# Patient Record
(demographics unavailable — no encounter records)

---

## 2017-04-18 NOTE — ERD
ER Documentation


Chief Complaint


Date/Time


DATE: 4/18/17 


TIME: 07:29


Chief Complaint


vaginal pain/itching/whitish discharge x 10 days





HPI


28-year-old female presents with vaginal itching and white discharge for the 

past 10 days.  She states that she is trying to get pregnant at this time, and 

would prefer to treat conservatively.  She denies any fevers, chills.  About 2 

weeks ago, she met with her OB/GYN who did a full panel of STDs, including 

blood work.  She is currently taking prenatal vitamins.  Patient reports that 

her workup was all negative.





ROS


All systems reviewed and are negative except as per history of present illness.





Medications


Home Meds


Active Scripts


Miconazole Nitrate* (Miconazole Nitrate*) 2% - 15 Gm Cr, 1 APPLIC TOP BID, #15 

TUB


   Prov:ANIBAL CORMIER PA-C         4/18/17


Clotrimazole* (Clotrimazole-7*) Vaginal Cream..g., 1 APPLIC VAG HS for 7 Days, 

EA


   Prov:ANIBAL CORMIER PA-C         4/18/17





Allergies


Allergies:  


Coded Allergies:  


     tramadol (Verified  Allergy, Unknown, sob, 4/18/17)





PMhx/Soc


History of Surgery:  Yes (Bilateral Breast Augmentation)


Anesthesia Reaction:  No


Hx Neurological Disorder:  No


Hx Respiratory Disorders:  No


Hx Cardiac Disorders:  No


Hx Psychiatric Problems:  No


Hx Miscellaneous Medical Probl:  Yes (Miscarriage)


Hx Alcohol Use:  No


Hx Substance Use:  No


Hx Tobacco Use:  No





Physical Exam


Vitals





Vital Signs








  Date Time  Temp Pulse Resp B/P Pulse Ox O2 Delivery O2 Flow Rate FiO2


 


4/18/17 06:10 97.6 63 20 98/61 100   








Physical Exam


General: Well-developed, well-nourished.  The patient appears in no acute 

distress.


HEENT: Head is normocephalic, atraumatic.  No scleral icterus.


Neck: Supple.  Nontender.


Lungs: Clear to auscultation.  Normal air movement.


Heart: Regular rate and rhythm.  S1 and S2 are normal.  No murmurs, gallops, or 

rubs.


Abdomen: Nondistended.


: There is White yeastlike discharge, no CMT tenderness, no masses.  No 

bleeding.


Extremities: No clubbing or cyanosis. Moving extremities x 4. No weakness.


Neurologic: Alert and oriented 3.  No focal deficits. Normal speech and gait.


Skin: Normal turgor.  No rash or lesions.





Procedures/MDM


ER course:


Urine pregnancy was negative.





MDM: 20-year-old female presents with yeast vaginitis 2 days.  Differentials 

include UTI, PID, cervicitis, HSV, syphilis, and among others.





Departure


Diagnosis:  


 Primary Impression:  


 Vaginitis


Condition:  Good


Patient Instructions:  Vaginal Infection: Yeast (Candidiasis)





Additional Instructions:  


Call your primary care doctor TOMORROW for an appointment during the next 1-2 

days.See the doctor sooner or return here if your condition worsens before your 

appointment time.











ANIBAL CORMIER PA-C Apr 18, 2017 07:31

## 2017-04-29 NOTE — ERD
ER Documentation


Chief Complaint


Date/Time


DATE: 4/29/17 


TIME: 11:54


Chief Complaint


FEVER/COUGH/SORE THROAT X 1 WEEK





HPI


This 20-year-old female presents with fever and cough and sore throat for last 

week.  She has productive mucus.  She denies wheezing, vomiting, abdominal pain

, chest pain, neck stiffness, rashes.





ROS


All systems reviewed and are negative except as per history of present illness.





Medications


Home Meds


Active Scripts


Azithromycin* (Zithromax*) 250 Mg Tablet, 250 MG PO .ZPACK AS DIRECTED, #6 TAB


   TAKE 500 MG (2 TABS) THE FIRST DAY THEN 250 MG (1 TAB) DAYS 2-5


   Prov:JESSI CORBIN MD         4/29/17


Dextromethorphan Hb-Promethazine Hcl (Promethazine DM Syrup) 473 Ml Syrup, 5 ML 

PO Q6H Y for COUGH, #4 OZ


   Prov:JESSI CORBIN MD         4/29/17


Ibuprofen* (Motrin*) 600 Mg Tab, 600 MG PO Q6, #15 TAB


   Prov:JESSI CORBIN MD         4/29/17


Miconazole Nitrate* (Miconazole Nitrate*) 2% - 15 Gm Cr, 1 APPLIC TOP BID, #15 

TUB


   Prov:ANIBAL CORMIER PA-C         4/18/17


Clotrimazole* (Clotrimazole-7*) Vaginal Cream..g., 1 APPLIC VAG HS for 7 Days, 

EA


   Prov:ANIBAL CORMIER PA-C         4/18/17





Allergies


Allergies:  


Coded Allergies:  


     tramadol (Verified  Allergy, Unknown, sob, 4/18/17)





PMhx/Soc


History of Surgery:  Yes (Bilateral Breast Augmentation)


Anesthesia Reaction:  No


Hx Neurological Disorder:  No


Hx Respiratory Disorders:  No


Hx Cardiac Disorders:  No


Hx Psychiatric Problems:  No


Hx Miscellaneous Medical Probl:  Yes (Miscarriage)


Hx Alcohol Use:  No


Hx Substance Use:  No


Hx Tobacco Use:  No





Physical Exam


Vitals





Vital Signs








  Date Time  Temp Pulse Resp B/P Pulse Ox O2 Delivery O2 Flow Rate FiO2


 


4/29/17 11:24 98.1 75 18 115/65 99   








Physical Exam


Const:  [] Alert, non-ill-appearing.


Head:   Atraumatic 


Eyes:    Normal Conjunctiva


ENT:    Normal External Ears, Nose and Mouth.  TMs normal.  Pupils nasal 

congestion with postnasal drip.


Neck:               Full range of motion..~ No meningismus.


Resp:    Clear to auscultation bilaterally.  Coarse cough without rales, 

wheezing or retractions.


Cardio:    Regular rate and rhythm, no murmurs


Abd:    Soft, non tender, non distended. Normal bowel sounds


Skin:    No petechiae or rashes


Back:    No midline or flank tenderness


Ext:    No cyanosis, or edema


Neur:    Awake and alert


Psych:    Normal Mood and Affect





Procedures/MDM


Patient presents with URI symptoms for last week without evidence of 

respiratory distress or hypoxemia.  Given the duration patient request she will 

be treated with Zithromax, promethazine and ibuprofen although this may be a 

viral illness.  The patient was stable with no new complaints during the ER 

course. Clinically, there is no current evidence to suggest meningitis, sepsis, 

acute abdomen, pneumonia, acute coronary syndrome, pulmonary embolism, or any 

other emergent condition appearing to require further evaluation or 

hospitalization. The patient should certainly return for any new or worsening 

symptoms per the aftercare instructions. They should otherwise follow-up with 

her primary care doctor for reevaluation this week.





Departure


Diagnosis:  


 Primary Impression:  


 URI, acute


 Additional Impression:  


 Fever


 Fever type:  unspecified  Qualified Code:  R50.9 - Fever, unspecified fever 

cause


Condition:  Stable


Patient Instructions:  Acute Bronchitis, Fever Control (Adult)





Additional Instructions:  


Recheck for new or worsening symptoms or primary care doctor.











JESSI CORBIN MD Apr 29, 2017 11:56
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
